# Patient Record
Sex: FEMALE | Race: WHITE | URBAN - METROPOLITAN AREA
[De-identification: names, ages, dates, MRNs, and addresses within clinical notes are randomized per-mention and may not be internally consistent; named-entity substitution may affect disease eponyms.]

---

## 2018-06-04 ENCOUNTER — OFFICE VISIT (OUTPATIENT)
Dept: OBGYN CLINIC | Age: 33
End: 2018-06-04

## 2018-06-04 VITALS
SYSTOLIC BLOOD PRESSURE: 106 MMHG | HEIGHT: 65 IN | DIASTOLIC BLOOD PRESSURE: 68 MMHG | WEIGHT: 122.6 LBS | BODY MASS INDEX: 20.43 KG/M2

## 2018-06-04 DIAGNOSIS — O20.0 THREATENED MISCARRIAGE IN EARLY PREGNANCY: Primary | ICD-10-CM

## 2018-06-04 PROBLEM — Z34.90 PREGNANT: Status: ACTIVE | Noted: 2018-06-04

## 2018-06-04 NOTE — PROGRESS NOTES
Pregnancy Confirmation Visit    Jenny Starkey is a 29 y.o. G1 at 9w5d by last menstrual period presenting for pregnancy confirmation visit. Ultrasound today concerning for missed ab (no FHTs, enlarged yolk sac, measuring several weeks behind LMP dating). This is strongly desired pregnancy for patient and her  Torres Lau, but they had not been trying to conceive for long, as they believe they conceived on their wedding night. Appropriately tearful today in light of ultrasound findings. Pt does not know her blood type. She had mild cramping a few weeks ago, no current cramping or bleeding. +mild fatigue, breast tenderness, and occasional mild nausea. No other problems or concerns. TV ULTRASOUND PERFORMED  A SINGLE NONVIABLE 5W6D IUP IS SEEN WITH ABSENT CARDIAC RHYTHM. GESTATIONAL AGE BASED ON TODAYS ULTRASOUND. [place] [facility] [place]. RIGHT OVARY APPEARS WITHIN NORMAL LIMITS. LEFT OVARY APPEARS WITHIN NORMAL LIMITS. NO FREE FLUID IS SEEN IN THE CDS. Ob/Gyn Hx:  G1  LMP- Date: 3/28/18 estimated --> EDUARDO 1/2/19  Menstrual pattern prior to conception: longer cycles lasting 35-45 days  Contraception at time of conception? none  Date of 1st positive UPT: 5/3/18  STI- denies  ? SA- yes,  \"Armando\"    Health maintenance:  Pap- 2016 or 2017, normal per patient in [facility] h/o abnl pap  Gardasil- outside of recommended age  [de-identified]- not indicated    History reviewed. No pertinent past medical history. History reviewed. No pertinent surgical history. Family History   Problem Relation Age of Onset    No Known Problems Mother     Hypertension Father     Lung Cancer Maternal Aunt     Diabetes Paternal Grandmother      Social History     Social History    Marital status:      Spouse name: N/A    Number of children: N/A    Years of education: N/A     Occupational History    Not on file.      Social History Main Topics    Smoking status: Never Smoker    Smokeless tobacco: Never Used    Alcohol use No    Drug use: No    Sexual activity: Yes     Partners: Male     Birth control/ protection: None     Other Topics Concern    Not on file     Social History Narrative    No narrative on file         Allergies   Allergen Reactions    Tellez Bowles Diarrhea         Review of Systems - History obtained from the patient  Constitutional: negative for weight loss, fever, night sweats, +fatigue  HEENT: negative for hearing loss, earache, congestion, snoring, sorethroat  CV: negative for chest pain, palpitations, edema  Resp: negative for cough, shortness of breath, wheezing  GI: negative for change in bowel habits, abdominal pain, black or bloody stools  : negative for frequency, dysuria, hematuria, vaginal discharge, no bleeding or cramping  MSK: negative for back pain, joint pain, muscle pain  Breast: negative for breast lumps, nipple discharge, galactorrhea, +tenderness  Skin :negative for itching, rash, hives  Neuro: negative for dizziness, headache, confusion, weakness  Psych: negative for anxiety, depression, change in mood  Heme/lymph: negative for bleeding, bruising, pallor    Physical Exam    Visit Vitals    /68 (BP 1 Location: Left arm, BP Patient Position: Sitting)    Ht 5' 5\" (1.651 m)    Wt 122 lb 9.6 oz (55.6 kg)    LMP 03/28/2018 (Exact Date)    BMI 20.4 kg/m2       Constitutional  · Appearance: well-nourished, well developed, alert, in no acute distress    HENT  · Head and Face: appears normal    Chest  · Respiratory Effort: non-labored breathing  · Auscultation: CTAB, normal breath sounds    Cardiovascular  · Heart:  · Auscultation: regular rate and rhythm without murmur  · Extremities: no peripheral edema    Gastrointestinal  · Abdominal Examination: abdomen non-tender to palpation, normal bowel sounds, no masses present  · Liver and spleen: no hepatomegaly present, spleen not palpable  · Hernias: no hernias identified    Genitourinary: deferred today    Skin  · General Inspection: no rash, no lesions identified    Neurologic/Psychiatric  · Mental Status:  · Orientation: grossly oriented to person, place and time  · Mood and Affect: mood normal, affect appropriate      Assessment/Plan:  29 y.o. G1 with concern for threatened vs. Missed ab on ultrasound today.     -reviewed US findings with patient today, and concern for possible miscarriage given discrepancy with LMP dating, enlarged yolk sac, and absence of FCM  -beta HCG today and again in 48 hours  -T&S today  -bleeding precautions reviewed  -RTC: 1 week for repeat ultrasound and follow up     Caro Youssef MD  6/4/2018  2:59 PM

## 2018-06-04 NOTE — PATIENT INSTRUCTIONS
Miscarriage: Care Instructions  Your Care Instructions    The loss of a pregnancy can be very hard. You may wonder why it happened or blame yourself. Miscarriages are common and are not caused by exercise, stress, or sex. Most happen because the fertilized egg in the uterus does not develop normally. There is no treatment that can stop a miscarriage. As long as you do not have heavy blood loss, fever, weakness, or other signs of infection, you can let a miscarriage follow its own course. This can take several days. Your body will recover over the next several weeks. Having a miscarriage does not mean you cannot have a normal pregnancy in the future. The doctor has checked you carefully, but problems can develop later. If you notice any problems or new symptoms, get medical treatment right away. Follow-up care is a key part of your treatment and safety. Be sure to make and go to all appointments, and call your doctor if you are having problems. It's also a good idea to know your test results and keep a list of the medicines you take. How can you care for yourself at home? · You will probably have some vaginal bleeding for 1 to 2 weeks. It may be similar to or slightly heavier than a normal period. The bleeding should get lighter after a week. Use pads instead of tampons. You may use tampons during your next period, which should start in 3 to 6 weeks. · Take an over-the-counter pain medicine, such as acetaminophen (Tylenol), ibuprofen (Advil, Motrin), or naproxen (Aleve) for cramps. Read and follow all instructions on the label. You may have cramps for several days after the miscarriage. · Do not take two or more pain medicines at the same time unless the doctor told you to. Many pain medicines have acetaminophen, which is Tylenol. Too much acetaminophen (Tylenol) can be harmful. · Use a clear container to save any tissue that you pass. Take it to your doctor's office as soon as you can.   · Do not have sex until the bleeding stops. · You may return to your normal activities if you feel well enough to do so. But you should avoid heavy exercise until the bleeding stops. · If you plan to get pregnant again, check with your doctor. Most doctors suggest waiting until you have had at least one normal period before you try to get pregnant. · If you do not want to get pregnant, ask your doctor about birth control. You can get pregnant again before your next period starts if you are not using birth control. · You may be low in iron because of blood loss. Eat a balanced diet that is high in iron and vitamin C. Foods rich in iron include red meat, shellfish, eggs, beans, and leafy green vegetables. Foods high in vitamin C include citrus fruits, tomatoes, and broccoli. Talk to your doctor about whether you need to take iron pills or a multivitamin. · The loss of a pregnancy can be very hard. You may wonder why it happened and blame yourself. Talking to family members, friends, a counselor, or your doctor may help you cope with your loss. When should you call for help? Call 911 anytime you think you may need emergency care. For example, call if:  ? · You passed out (lost consciousness). ?Call your doctor now or seek immediate medical care if:  ? · You have severe vaginal bleeding. ? · You are dizzy or lightheaded, or you feel like you may faint. ? · You have new or worse pain in your belly or pelvis. ? · You have a fever. ? · You have vaginal discharge that smells bad. ? Watch closely for changes in your health, and be sure to contact your doctor if:  ? · You do not get better as expected. Where can you learn more? Go to http://ivania-michelle.info/. Enter E802 in the search box to learn more about \"Miscarriage: Care Instructions. \"  Current as of: March 16, 2017  Content Version: 11.4  © 6375-9671 Healthwise, [employer].  Care instructions adapted under license by [employer] (which disclaims liability or warranty for this information). If you have questions about a medical condition or this instruction, always ask your healthcare professional. [address] any warranty or liability for your use of this information.

## 2018-06-04 NOTE — MR AVS SNAPSHOT
[address] [address] [address] [place] [address] 
[phone] Patient: Luz Carr MRN: [id number] :1985 Visit Information Date & Time Provider Department Dept. Phone Encounter #  
 2018  2:40 PM Mauricio Sanchez MD [address] [facility] [phone] [id number] Your Appointments 2018  2:40 PM  
New Patient with Mauricio Sanchez MD  
[address] [facility] ([facility]) Appt Note: NOB/US + Dr. Celina Márquez 4.4/POS UPT/bcbs [address], [address] [place] [address] [address] [place] [address] Upcoming Health Maintenance Date Due  
 PAP AKA CERVICAL CYTOLOGY 2008 Influenza Age 5 to Adult 2018 Allergies as of 2018  Never Reviewed Severity Noted Reaction Type Reactions Tellez Bowles  2018    Diarrhea Current Immunizations  Never Reviewed No immunizations on file. Not reviewed this visit Vitals BP Height(growth percentile) Weight(growth percentile) LMP  
 106/68 (BP 1 Location: Left arm, BP Patient Position: Sitting) 5' 5\" (1.651 m) 122 lb 9.6 oz (55.6 kg) 2018 (Exact Date) BMI OB Status Smoking Status 20.4 kg/m2 Having regular periods Never Smoker BMI and BSA Data Body Mass Index Body Surface Area  
 20.4 kg/m 2 1.6 m 2 Your Updated Medication List  
  
Notice  As of 2018  2:39 PM  
 You have not been prescribed any medications. Patient Instructions Miscarriage: Care Instructions Your Care Instructions The loss of a pregnancy can be very hard. You may wonder why it happened or blame yourself. Miscarriages are common and are not caused by exercise, stress, or sex. Most happen because the fertilized egg in the uterus does not develop normally. There is no treatment that can stop a miscarriage.  As long as you do not have heavy blood loss, fever, weakness, or other signs of infection, you can let a miscarriage follow its own course. This can take several days. Your body will recover over the next several weeks. Having a miscarriage does not mean you cannot have a normal pregnancy in the future. The doctor has checked you carefully, but problems can develop later. If you notice any problems or new symptoms, get medical treatment right away. Follow-up care is a key part of your treatment and safety. Be sure to make and go to all appointments, and call your doctor if you are having problems. It's also a good idea to know your test results and keep a list of the medicines you take. How can you care for yourself at home? · You will probably have some vaginal bleeding for 1 to 2 weeks. It may be similar to or slightly heavier than a normal period. The bleeding should get lighter after a week. Use pads instead of tampons. You may use tampons during your next period, which should start in 3 to 6 weeks. · Take an over-the-counter pain medicine, such as acetaminophen (Tylenol), ibuprofen (Advil, Motrin), or naproxen (Aleve) for cramps. Read and follow all instructions on the label. You may have cramps for several days after the miscarriage. · Do not take two or more pain medicines at the same time unless the doctor told you to. Many pain medicines have acetaminophen, which is Tylenol. Too much acetaminophen (Tylenol) can be harmful. · Use a clear container to save any tissue that you pass. Take it to your doctor's office as soon as you can. · Do not have sex until the bleeding stops. · You may return to your normal activities if you feel well enough to do so. But you should avoid heavy exercise until the bleeding stops. · If you plan to get pregnant again, check with your doctor. Most doctors suggest waiting until you have had at least one normal period before you try to get pregnant. · If you do not want to get pregnant, ask your doctor about birth control. You can get pregnant again before your next period starts if you are not using birth control. · You may be low in iron because of blood loss. Eat a balanced diet that is high in iron and vitamin C. Foods rich in iron include red meat, shellfish, eggs, beans, and leafy green vegetables. Foods high in vitamin C include citrus fruits, tomatoes, and broccoli. Talk to your doctor about whether you need to take iron pills or a multivitamin. · The loss of a pregnancy can be very hard. You may wonder why it happened and blame yourself. Talking to family members, friends, a counselor, or your doctor may help you cope with your loss. When should you call for help? Call 911 anytime you think you may need emergency care. For example, call if: 
? · You passed out (lost consciousness). ?Call your doctor now or seek immediate medical care if: 
? · You have severe vaginal bleeding. ? · You are dizzy or lightheaded, or you feel like you may faint. ? · You have new or worse pain in your belly or pelvis. ? · You have a fever. ? · You have vaginal discharge that smells bad. ? Watch closely for changes in your health, and be sure to contact your doctor if: 
? · You do not get better as expected. Where can you learn more? Go to http://ivania-michelle.info/. Enter E802 in the search box to learn more about \"Miscarriage: Care Instructions. \" Current as of: March 16, 2017 Content Version: 11.4 © 3181-6594 [employer]. Care instructions adapted under license by [employer] (which disclaims liability or warranty for this information). If you have questions about a medical condition or this instruction, always ask your healthcare professional. [address] [place] [address] any warranty or liability for your use of this information. Introducing [facility]! [facility] introduces [employer] patient portal. Now you can access parts of your medical record, email your doctor's office, and request medication refills online. 1. In your internet browser, go to [email or web address]/"[email or web address] 2. Click on the First Time User? Click Here link in the Sign In box. You will see the New Member Sign Up page. 3. Enter your [employer] Access Code exactly as it appears below. You will not need to use this code after youve completed the sign-up process. If you do not sign up before the expiration date, you must request a new code. · [employer] Access Code: [email or web address] Expires: 9/2/2018  2:39 PM 
 
4. Enter the last four digits of your Social Security Number (xxxx) and Date of Birth (mm/dd/yyyy) as indicated and click Submit. You will be taken to the next sign-up page. 5. Create a [employer] ID. This will be your [employer] login ID and cannot be changed, so think of one that is secure and easy to remember. 6. Create a [employer] password. You can change your password at any time. 7. Enter your Password Reset Question and Answer. This can be used at a later time if you forget your password. 8. Enter your e-mail address. You will receive e-mail notification when new information is available in [address] 9. Click Sign Up. You can now view and download portions of your medical record. 10. Click the Download Summary menu link to download a portable copy of your medical information. If you have questions, please visit the Frequently Asked Questions section of the [employer] website. Remember, [employer] is NOT to be used for urgent needs. For medical emergencies, dial 911. Now available from your iPhone and Android! Please provide this summary of care documentation to your next provider. If you have any questions after today's visit, please call [phone].

## 2018-06-05 LAB
ABO GROUP BLD: NORMAL
HCG INTACT+B SERPL-ACNC: NORMAL MIU/ML
RH BLD: NEGATIVE

## 2018-06-05 NOTE — PROGRESS NOTES
Blood type B negative. Please inform patient that she will need rhogam if she miscarries or has any significant bleeding events in the pregnancy. We await her repeat beta HCG level tomorrow.

## 2018-06-06 ENCOUNTER — LAB ONLY (OUTPATIENT)
Dept: OBGYN CLINIC | Age: 33
End: 2018-06-06

## 2018-06-06 ENCOUNTER — TELEPHONE (OUTPATIENT)
Dept: OBGYN CLINIC | Age: 33
End: 2018-06-06

## 2018-06-06 DIAGNOSIS — O20.0 THREATENED ABORTION: Primary | ICD-10-CM

## 2018-06-06 LAB — HCG INTACT+B SERPL-ACNC: NORMAL MIU/ML

## 2018-06-06 NOTE — PROGRESS NOTES
Spoke with patient and reviewed results, concern for likely missed ab at this time. Pt to follow up in office on Monday for further management if she hasn't passed pregnancy yet by that time.  Reviewed bleeding and infx precautions and reasons to call or go to hospital.

## 2018-06-06 NOTE — TELEPHONE ENCOUNTER
STAT LABS HAVE BEEN FAXED FOR YOUR REVIEW TO YOUR LOCATION (by Kristie Snyder LPN). Please see fax machine if not scanned in for you by staff.

## 2018-06-11 ENCOUNTER — ROUTINE PRENATAL (OUTPATIENT)
Dept: OBGYN CLINIC | Age: 33
End: 2018-06-11

## 2018-06-11 DIAGNOSIS — O20.0 THREATENED ABORTION: Primary | ICD-10-CM

## 2018-06-11 DIAGNOSIS — O02.1 MISSED AB: ICD-10-CM

## 2018-06-11 NOTE — PATIENT INSTRUCTIONS
Incomplete Miscarriage: Care Instructions  Your Care Instructions    A miscarriage is the loss of a pregnancy during the first 20 weeks. Miscarriages are very common. Most happen because the fertilized egg in the uterus does not develop normally. Stress, exercise, or sex does not cause a miscarriage. While many miscarriages pass on their own, some do not. These are called incomplete miscarriages because all of the tissue related to pregnancy is not shed from the uterus. An incomplete miscarriage often requires treatment. Medicine or a procedure call dilation and curettage (D&C) is used to clear the tissue from the uterus. If your blood type is Rh negative, ask your doctor if you need a shot of Rh immune globulin (RhoGAM) to prevent problems in future pregnancies. Follow-up care is a key part of your treatment and safety. Be sure to make and go to all appointments, and call your doctor if you are having problems. It's also a good idea to know your test results and keep a list of the medicines you take. How can you care for yourself at home? · You will probably have vaginal bleeding for 1 to 2 weeks after treatment. It may be similar to or slightly heavier than a normal period. Use pads instead of tampons. You may use tampons during your next period. It should start in 3 to 6 weeks. · Take an over-the-counter pain medicine, such as acetaminophen (Tylenol), ibuprofen (Advil, Motrin), or naproxen (Aleve), for cramps. You may have cramps for several days after the miscarriage. Be safe with medicines. Read and follow all instructions on the label. · Do not take two or more pain medicines at the same time unless the doctor told you to. Many pain medicines have acetaminophen, which is Tylenol. Too much acetaminophen (Tylenol) can be harmful. · Your doctor may ask you to use a clear container to save any tissue or clots that you pass. Take it to your doctor's office right away.   · Do not have sex until the bleeding stops. · You may return to your normal activities if you feel well enough to do so. But you should avoid heavy exercise until the bleeding stops. · If you plan to get pregnant again, check with your doctor. Most doctors suggest waiting until you have had at least one normal period before you try to get pregnant. · If you do not want to get pregnant, ask your doctor about birth control. You can get pregnant again before your next period starts. · You may be low in iron because of blood loss. Eat a balanced diet that is high in iron and vitamin C. Foods rich in iron include red meat, shellfish, eggs, beans, and leafy green vegetables. Talk to your doctor about whether you need to take iron pills or a multivitamin. · The loss of a pregnancy can be very hard. Give yourself and your partner time to grieve. Even if your miscarriage occurred very early, you may still have feelings of loss. You may wonder why it happened and blame yourself. ¨ Talking to family members, friends, or a counselor may help you cope with your loss. ¨ If your feelings of sadness last longer than 2 weeks, tell your doctor or a counselor. When should you call for help? Call 911 anytime you think you may need emergency care. For example, call if:  ? · You passed out (lost consciousness). ?Call your doctor now or seek immediate medical care if:  ? · You have severe vaginal bleeding. ? · You are dizzy or lightheaded, or you feel like you may faint. ? · You have a fever. ? · You have new or worse pain in your belly or pelvis. ? · You have vaginal discharge that smells bad. ? Watch closely for changes in your health, and be sure to contact your doctor if:  ? · You do not get better as expected. Where can you learn more? Go to http://ivania-michelle.info/. Enter L103 in the search box to learn more about \"Incomplete Miscarriage: Care Instructions. \"  Current as of: March 16, 2017  Content Version: 11.4  © 3149-3246 Healthwise, Inc[employer]. Care instructions adapted under license by [employer] (which disclaims liability or warranty for this information). If you have questions about a medical condition or this instruction, always ask your healthcare professional. [address] [address] any warranty or liability for your use of this information.

## 2018-06-11 NOTE — PROGRESS NOTES
OB Problem Follow Up Visit    Ashlee Perez is a 29 y.o. G1 who presents with her  Jacquelin Granado for follow up of suspected missed ab at approx 5 wks GA. Ultrasound today with absent cardiac rhythm. Pt starting to feel some mild cramping discomfort in lower back. Denies vaginal bleeding, fevers/chills. Rh neg. Beta HC/4 - 88856   [id number]    Ultrasound 18:  TV ULTRASOUND PERFORMED  A SINGLE NONVIABLE 5W6D IUP IS SEEN WITH ABSENT CARDIAC RHYTHM. GESTATIONAL AGE BASED ON TODAYS ULTRASOUND. ABNORMAL [address] IS SEEN. RIGHT OVARY APPEARS WITHIN NORMAL LIMITS. LEFT OVARY APPEARS WITHIN NORMAL LIMITS. NO FREE FLUID IS SEEN IN THE CDS. Ultrasound 18:  TV ULTRASOUND PERFORMED  A SINGLE NONVIABLE 5W6D IUP IS SEEN WITH ABSENT CARDIAC RHYTHM. GESTATIONAL AGE BASED ON TODAYS ULTRASOUND. ABNORMAL [address]OL[address] IS SEEN. RIGHT OVARY APPEARS WITHIN NORMAL LIMITS. LEFT OVARY APPEARS WITHIN NORMAL LIMITS. NO FREE FLUID IS SEEN IN THE CDS. Ob/Gyn Hx:  G1  LMP- Date: 3/28/18 estimated --> EDUARDO 19  Menstrual pattern prior to conception: longer cycles lasting 35-45 days  Contraception at time of conception? none  Date of 1st positive UPT: 5/3/18  STI- denies  ? SA- yes,  \"Armando\"    Health maintenance:  Pap- 2016 or 2017, normal per patient in [place], denies h/o abnl pap  Gardasil- outside of recommended age  [de-identified]- not indicated    No past medical history on file. No past surgical history on file. Family History   Problem Relation Age of Onset    No Known Problems Mother     Hypertension Father     Lung Cancer Maternal Aunt     Diabetes Paternal Grandmother      Social History     Social History    Marital status:      Spouse name: N/A    Number of children: N/A    Years of education: N/A     Occupational History    Not on file.      Social History Main Topics    Smoking status: Never Smoker    Smokeless tobacco: Never Used    Alcohol use No    Drug use: No    Sexual activity: Yes     Partners: Male     Birth control/ protection: None     Other Topics Concern    Not on file     Social History Narrative    Just  April 2018.  Adelaida Avila. 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 Allergies   Allergen Reactions    Tellez Bowles Diarrhea         Review of Systems - History obtained from the patient  Constitutional: negative for weight loss, fever, night sweats, +fatigue  HEENT: negative for hearing loss, earache, congestion, snoring, sorethroat  CV: negative for chest pain, palpitations, edema  Resp: negative for cough, shortness of breath, wheezing  GI: negative for change in bowel habits, abdominal pain, black or bloody stools  : negative for frequency, dysuria, hematuria, vaginal discharge, no bleeding or cramping  MSK: negative for back pain, joint pain, muscle pain  Breast: negative for breast lumps, nipple discharge, galactorrhea, +tenderness  Skin :negative for itching, rash, hives  Neuro: negative for dizziness, headache, confusion, weakness  Psych: negative for anxiety, depression, change in mood  Heme/lymph: negative for bleeding, bruising, pallor    Physical Exam    There were no vitals taken for this visit.     Constitutional  · Appearance: well-nourished, well developed, alert, in no acute distress    HENT  · Head and Face: appears normal    Chest  · Respiratory Effort: non-labored breathing  · Auscultation: CTAB, normal breath sounds    Cardiovascular  · Heart:  · Auscultation: regular rate and rhythm without murmur  · Extremities: no peripheral edema    Gastrointestinal  · Abdominal Examination: abdomen non-tender to palpation, normal bowel sounds, no masses present  · Liver and spleen: no hepatomegaly present, spleen not palpable  · Hernias: no hernias identified    Genitourinary: deferred today    Skin  · General Inspection: no rash, no lesions identified    Neurologic/Psychiatric  · Mental Status:  · Orientation: grossly oriented to person, place and time  · Mood and Affect: mood normal, affect appropriate      Assessment/Plan:  29 y.o. G1 with 5 week missed ab.     -reviewed US findings and HCG values with patient today  -condolences offered, reassured pt that risk of recurrence is not significantly increased after just one sab and that there was nothing she could have done to prevent this  -discussed options for management of missed ab (expectant, medical, surgical)  -pt would like to wait and see if she passes pregnancy on her own for 1 more week, if she has not passed pregnancy in 1 week she would like suction D&C  -consent forms for suction D&C signed today --> will fax to ANNA RIBERA for scheduling early next week  -will need Rhogam at time of sab or D&C d/t Rh neg status  -bleeding and infection precautions reviewed, and reasons to seek medical tx  -did not recommend genetics as this is her first miscarriage, but if collects tissue can send to pathologist  -discussed that she may resume attempts to conceive after her first normal cycle following resolution of current pregnancy    -RTC: 1 week    Fab Monteiro MD  6/11/2018  2:42 PM

## 2018-06-12 ENCOUNTER — TELEPHONE (OUTPATIENT)
Dept: OBGYN CLINIC | Age: 33
End: 2018-06-12

## 2018-06-12 DIAGNOSIS — O02.1 MISSED ABORTION: Primary | ICD-10-CM

## 2018-06-12 LAB — HCG INTACT+B SERPL-ACNC: NORMAL MIU/ML

## 2018-06-12 NOTE — TELEPHONE ENCOUNTER
MD Harmeet Manzanares; James Mac 10 minutes ago (1:04 PM)               I called and spoke with patient, informed her of results and answered questions. She is scheduled for [facility]  Monday (Routing comment)                    Elvin Martins MD   You 14 minutes ago (12:59 PM)                 Please let her know that her beta HCG value dropped from 60226 to 18550, consisted with a missed  (miscarriage). This diagnosis was discussed in office yesterday and will not be a surprise to her, the beta level just confirms our suspicion. She will be posted for a D&C early next week unless she changes her mind and wants to do medical management. Please let her know that I am happy to speak with her if she has other questions or concerns.  Thanks! (Routing comment)

## 2018-06-12 NOTE — TELEPHONE ENCOUNTER
Patient calling for her blood work beta results from yesterday. Patient advised that they are back but has not been reviewed. Patient asked me to send the message to Dr. Yessenia Selby for review and to get back with her on the results and recommendations.     Patient can be reached at [phone]

## 2018-06-14 ENCOUNTER — DOCUMENTATION ONLY (OUTPATIENT)
Dept: OBGYN CLINIC | Age: 33
End: 2018-06-14

## 2018-06-14 RX ORDER — MISOPROSTOL 200 UG/1
800 TABLET ORAL ONCE
Qty: 4 TAB | Refills: 1 | Status: SHIPPED | OUTPATIENT
Start: 2018-06-14 | End: 2018-06-14

## 2018-06-14 NOTE — PROGRESS NOTES
Returned patient phone call. She has decided after review of management options that she would prefer trial of medical management for 5 wk missed ab. Rx for cytotec 800mcg PV provided, with 1 refill (pt to repeat dose 24-48 hrs after 1st dose if she has not yet passed pregnancy). Reviewed ~80% success rate, potential side effects, bleeding precautions, infection/sepsis precautions, and reasons to go to hospital.    Pt does not plan to take medication until Monday because she will be out of town until that time. Pt will come to office for Rhogam injection when she passes pregnancy as she is Rh negative. Given change of plans, will cancel D&C for Monday and reschedule for later in the week in event that medical management is unsuccessful. Willy Lockwood (surgical scheduler) made aware of change in plans.     Steve Hansen MD  6/14/2018  3:31 PM

## 2018-06-15 ENCOUNTER — TELEPHONE (OUTPATIENT)
Dept: OBGYN CLINIC | Age: 33
End: 2018-06-15

## 2018-06-15 NOTE — TELEPHONE ENCOUNTER
Call received at 11:25am      29year old patient last seen in the office on 6/11/18. Patient calling to say that she started bleeding on her own and it is a light spotting . Patient states she started bleeding late last night. Patient denies cramping at this time. Patient has not taken the Cytotec. Patient is wondering if the bleeding will get heavy. Additionally patient is wondering when she should get the Rhogam since she is B negative . Patient states she would need to go the hospital in the [facility] this weekend or she could come to the office on Monday.  6/18/18        Please advise

## 2018-06-15 NOTE — TELEPHONE ENCOUNTER
Paris Mojica MD   You 6 minutes ago (1:05 PM)                 Bleeding will likely become significantly heavier if it is only just spotting now (Routing comment)                        Paris Mojica MD   You 8 minutes ago (1:03 PM)                   Generally speaking she should get the Rhogam injection within 72 hours of bleeding episode associated with pregnancy. If bleeding just started, she may be able to come in first thing Monday Morning for injection. Please reassure her that there is minimal evidence of significant feto-maternal hemorrhage in 1st trimester losses. Thanks!      Paris Mojica MD  6/15/2018  1:04 PM  (Routing comment)

## 2018-06-15 NOTE — TELEPHONE ENCOUNTER
Patient advised of MD recommendations and was placed on the schedule for Rhogam injection on 6./18/18 at 9:30am.      Patient verbalized understanding.

## 2018-06-18 ENCOUNTER — CLINICAL SUPPORT (OUTPATIENT)
Dept: OBGYN CLINIC | Age: 33
End: 2018-06-18

## 2018-06-18 DIAGNOSIS — Z67.91 RH NEGATIVE STATUS DURING PREGNANCY IN FIRST TRIMESTER: Primary | ICD-10-CM

## 2018-06-18 DIAGNOSIS — O26.891 RH NEGATIVE STATUS DURING PREGNANCY IN FIRST TRIMESTER: Primary | ICD-10-CM

## 2018-06-18 NOTE — PATIENT INSTRUCTIONS
Rho(D) Immune Globulin (By injection)   Rho(D) Immune Globulin (sang-lobito i-MUNE GLOB-ue-migue)  Given to a pregnant woman whose blood type is Rh-negative to keep the baby's blood from interacting with the mother's. Also treats a blood cell disorder called idiopathic thrombocytopenic purpura (ITP). Brand Name(s): HyperRHO S/D, MICRhoGAM Ultra-Filtered Plus, RhoGAM Ultra-Filtered Plus, Rhophylac, WinRho SDF   There may be other brand names for this medicine. When This Medicine Should Not Be Used: You should not receive Rho(D) immune globulin if you have had an allergic reaction to human immune globulin, or if you have certain bleeding problems (such as autoimmune hemolytic anemia) or immunoglobulin A (IgA) deficiency. This medicine should not be given to infants. How to Use This Medicine:   Injectable  · Your doctor will prescribe your exact dose and tell you how often it should be given. This medicine is given as a shot into a muscle or through a needle placed in one of your veins. · A nurse or other health provider will give you this medicine. .  If a dose is missed:   · It is very important that you receive this medicine on a fixed schedule if you are using the medicine for ITP or during pregnancy. If you are unable to keep an appointment for your injection, call your doctor or caregiver for instructions. Drugs and Foods to Avoid:   Ask your doctor or pharmacist before using any other medicine, including over-the-counter medicines, vitamins, and herbal products. · This medicine may interfere with vaccines. Ask your doctor before you get a flu shot or any other vaccines. Warnings While Using This Medicine:   · Make sure your doctor knows if you are pregnant or breastfeeding, or if you or your child have a history of kidney problems, anemia, blood clotting problems, heart or blood vessel problems (such as atherosclerosis), lung or breathing problems, or have had a stroke.   · Check with your doctor right away if you or your child have back pain, shaking chills, a fever, dark urine, a decreased amount of urine, a sudden weight gain, swelling of the hands or feet, or shortness of breath after receiving this vaccine. These may be symptoms of a serious blood problem called intravascular hemolysis (IVH). · This medicine is made from donated human blood. Some human blood products have transmitted certain viruses to people who have received them. The risk of getting a virus from medicines made from human blood has been greatly reduced in recent years. This is the result of required testing of human donors for certain viruses, and testing during the making of these medicines. Although the risk is low, talk with your doctor if you have concerns. · This medicine may cause serious types of allergic reactions, including anaphylaxis. Anaphylaxis can be life-threatening and requires immediate medical attention. Tell your doctor right away if you or your child have itching, a rash, hives, chest pain, dizziness or lightheadedness, trouble breathing, or any swelling of your hands, face, or mouth after you receive this medicine. · This medicine may cause blood clots, especially in patients with a history of blood clotting problems, heart disease, and atherosclerosis (hardening of the arteries) or circulation problems. Patients who stay in bed for a long time because of surgery or illness may also have blood clots. Check with your doctor right away if you or your child suddenly have chest pain, shortness of breath, a severe headache, leg pain, or problems with vision, speech, or walking. · This medicine may cause a rare and serious lung problem a few hours after it is given. Tell your doctor right away if you or your child have any breathing problems with or without a fever after you receive the medicine. · Your doctor will do lab tests at regular visits to check on the effects of this medicine. Keep all appointments.   Possible Side Effects While Using This Medicine:   Call your doctor right away if you notice any of these side effects:  · Allergic reaction: Itching or hives, swelling in your face or hands, swelling or tingling in your mouth or throat, chest tightness, trouble breathing  · Back pain, shaking chills, fever, or shortness of breath. · Black, bloody, or tarry stools. · Bloody or darkened urine. · Chest pain, shortness of breath, or coughing up blood. · Decrease in how much or how often you urinate. · Fast, slow, pounding, or uneven heartbeat. · Lightheadedness, dizziness, or fainting. · Numbness or weakness in your arm or leg, or on one side of your body. · Pain in your lower leg (calf). · Pinpoint red spots on the skin. · Rapid weight gain. · Sudden or severe headache, or problems with vision, speech, or walking. · Swelling in your hands, ankles, or feet. · Unusual bleeding or bruising. If you notice these less serious side effects, talk with your doctor:   · Diarrhea, nausea, or vomiting. · Headache. · Joint or muscle pain. · Pain, itching, burning, swelling, or a lump under your skin where the needle is placed. · Rash or itching skin. · Sleepiness or unusual drowsiness. · Tiredness. If you notice other side effects that you think are caused by this medicine, tell your doctor. Call your doctor for medical advice about side effects. You may report side effects to FDA at 2-360-NYM-3228  © 2017 [facility] Information is for End User's use only and may not be sold, redistributed or otherwise used for commercial purposes. The above information is an  only. It is not intended as medical advice for individual conditions or treatments. Talk to your doctor, nurse or pharmacist before following any medical regimen to see if it is safe and effective for you.

## 2018-06-27 ENCOUNTER — OFFICE VISIT (OUTPATIENT)
Dept: OBGYN CLINIC | Age: 33
End: 2018-06-27

## 2018-06-27 VITALS
DIASTOLIC BLOOD PRESSURE: 62 MMHG | WEIGHT: 121.2 LBS | HEIGHT: 65 IN | BODY MASS INDEX: 20.19 KG/M2 | SYSTOLIC BLOOD PRESSURE: 100 MMHG

## 2018-06-27 DIAGNOSIS — Z87.59 MISCARRIAGE WITHIN LAST 12 MONTHS: Primary | ICD-10-CM

## 2018-06-27 LAB
HCG URINE, QL. (POC): NEGATIVE
VALID INTERNAL CONTROL?: YES

## 2018-06-27 NOTE — PATIENT INSTRUCTIONS
Miscarriage: Care Instructions  Your Care Instructions    The loss of a pregnancy can be very hard. You may wonder why it happened or blame yourself. Miscarriages are common and are not caused by exercise, stress, or sex. Most happen because the fertilized egg in the uterus does not develop normally. There is no treatment that can stop a miscarriage. As long as you do not have heavy blood loss, fever, weakness, or other signs of infection, you can let a miscarriage follow its own course. This can take several days. Your body will recover over the next several weeks. Having a miscarriage does not mean you cannot have a normal pregnancy in the future. The doctor has checked you carefully, but problems can develop later. If you notice any problems or new symptoms, get medical treatment right away. Follow-up care is a key part of your treatment and safety. Be sure to make and go to all appointments, and call your doctor if you are having problems. It's also a good idea to know your test results and keep a list of the medicines you take. How can you care for yourself at home? · You will probably have some vaginal bleeding for 1 to 2 weeks. It may be similar to or slightly heavier than a normal period. The bleeding should get lighter after a week. Use pads instead of tampons. You may use tampons during your next period, which should start in 3 to 6 weeks. · Take an over-the-counter pain medicine, such as acetaminophen (Tylenol), ibuprofen (Advil, Motrin), or naproxen (Aleve) for cramps. Read and follow all instructions on the label. You may have cramps for several days after the miscarriage. · Do not take two or more pain medicines at the same time unless the doctor told you to. Many pain medicines have acetaminophen, which is Tylenol. Too much acetaminophen (Tylenol) can be harmful. · Use a clear container to save any tissue that you pass. Take it to your doctor's office as soon as you can.   · Do not have sex until the bleeding stops. · You may return to your normal activities if you feel well enough to do so. But you should avoid heavy exercise until the bleeding stops. · If you plan to get pregnant again, check with your doctor. Most doctors suggest waiting until you have had at least one normal period before you try to get pregnant. · If you do not want to get pregnant, ask your doctor about birth control. You can get pregnant again before your next period starts if you are not using birth control. · You may be low in iron because of blood loss. Eat a balanced diet that is high in iron and vitamin C. Foods rich in iron include red meat, shellfish, eggs, beans, and leafy green vegetables. Foods high in vitamin C include citrus fruits, tomatoes, and broccoli. Talk to your doctor about whether you need to take iron pills or a multivitamin. · The loss of a pregnancy can be very hard. You may wonder why it happened and blame yourself. Talking to family members, friends, a counselor, or your doctor may help you cope with your loss. When should you call for help? Call 911 anytime you think you may need emergency care. For example, call if:  ? · You passed out (lost consciousness). ?Call your doctor now or seek immediate medical care if:  ? · You have severe vaginal bleeding. ? · You are dizzy or lightheaded, or you feel like you may faint. ? · You have new or worse pain in your belly or pelvis. ? · You have a fever. ? · You have vaginal discharge that smells bad. ? Watch closely for changes in your health, and be sure to contact your doctor if:  ? · You do not get better as expected. Where can you learn more? Go to http://ivania-michelle.info/. Enter E802 in the search box to learn more about \"Miscarriage: Care Instructions. \"  Current as of: March 16, 2017  Content Version: 11.4  © 8784-0363 [employer], [employer].  Care instructions adapted under license by [employer] (which disclaims liability or warranty for this information). If you have questions about a medical condition or this instruction, always ask your healthcare professional. [address] any warranty or liability for your use of this information.

## 2018-06-27 NOTE — PROGRESS NOTES
Miscarriage Follow Up Visit    Dayana Felipe is a 29 y.o.  A1 who presents for follow up of recent miscarriage. Dark brown vaginal discharge continues, but is tapering off. No further cramping, no fevers/chills, otherwise feeling well. UPT negative in office today. Rh negative, Rhogam given in the office on 18. Ob/Gyn Hx:   A1 - SAB x1  Menses: longer cycles lasting 35-45 days  STI- denies  ? SA- yes,  \"Armando\"    Health maintenance:  Pap- 2016 or 2017, normal per patient in [place], denies h/o abnl pap  Gardasil- outside of recommended age    Past Medical History:   Diagnosis Date    GERD (gastroesophageal reflux disease)         History reviewed. No pertinent surgical history. Family History   Problem Relation Age of Onset    No Known Problems Mother     Hypertension Father     Lung Cancer Maternal Aunt     No Known Problems Sister     No Known Problems Brother     Diabetes Maternal Grandmother     Psychiatric Disorder Sister     Other Sister      HEART MURMER    No Known Problems Sister     Anesth Problems Neg Hx      Social History     Social History    Marital status:      Spouse name: N/A    Number of children: N/A    Years of education: N/A     Occupational History    Not on file. Social History Main Topics    Smoking status: Never Smoker    Smokeless tobacco: Never Used    Alcohol use 1.8 oz/week     3 Cans of beer per week      Comment: 3/WK    Drug use: No    Sexual activity: Yes     Partners: Male     Birth control/ protection: None     Other Topics Concern    Not on file     Social History Narrative    Just  2018.  Anders Cowden. 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          Allergies   Allergen Reactions    Tellez Bowles Diarrhea     VOMITTING AND DIARRHEA         Review of Systems - History obtained from the patient  Constitutional: negative for weight loss, fever, night sweats, +fatigue  HEENT: negative for hearing loss, earache, congestion, snoring, sorethroat  CV: negative for chest pain, palpitations, edema  Resp: negative for cough, shortness of breath, wheezing  GI: negative for change in bowel habits, abdominal pain, black or bloody stools  : negative for frequency, dysuria, hematuria, vaginal discharge, no bleeding or cramping  MSK: negative for back pain, joint pain, muscle pain  Breast: negative for breast lumps, nipple discharge, galactorrhea, +tenderness  Skin :negative for itching, rash, hives  Neuro: negative for dizziness, headache, confusion, weakness  Psych: negative for anxiety, depression, change in mood  Heme/lymph: negative for bleeding, bruising, pallor    Physical Exam  Visit Vitals    /62 (BP 1 Location: Left arm, BP Patient Position: Sitting)    Ht 5' 5\" (1.651 m)    Wt 121 lb 3.2 oz (55 kg)    BMI 20.17 kg/m2     Constitutional  · Appearance: well-nourished, well developed, alert, in no acute distress    HENT  · Head and Face: appears normal    Chest  · Respiratory Effort: non-labored breathing    Neurologic/Psychiatric  · Mental Status:  · Orientation: grossly oriented to person, place and time  · Mood and Affect: mood normal, affect appropriate    Recent Results (from the past 12 hour(s))   AMB POC URINE PREGNANCY TEST, VISUAL COLOR COMPARISON    Collection Time: 06/27/18 11:45 AM   Result Value Ref Range    VALID INTERNAL CONTROL POC Yes     HCG urine, Ql. (POC) Negative Negative     Assessment/Plan:  29 y.o. G1 s/p completed sab (at approx 5 wks GA).  UPT neg today, consistent with resolution of pregnancy    -reassurance provided that pregnancy seems to be resolved at this time  -discussed that she may resume attempts to conceive after her first normal cycle following resolution of current pregnancy  -given longer cycles, advised menstrual calender, discussed OPKs and optimal timing of IC  -advised continue PNV    -RTC: for annual exam or sooner andriy Hansen MD  6/27/2018  11:51 AM

## 2018-09-04 ENCOUNTER — OFFICE VISIT (OUTPATIENT)
Dept: OBGYN CLINIC | Age: 33
End: 2018-09-04

## 2018-09-04 VITALS
BODY MASS INDEX: 20.33 KG/M2 | RESPIRATION RATE: 16 BRPM | DIASTOLIC BLOOD PRESSURE: 72 MMHG | HEIGHT: 65 IN | WEIGHT: 122 LBS | SYSTOLIC BLOOD PRESSURE: 118 MMHG

## 2018-09-04 DIAGNOSIS — R10.2 PELVIC PAIN: Primary | ICD-10-CM

## 2018-09-04 NOTE — PROGRESS NOTES
Justin Carrillo is a 29 y.o. female    Chief Complaint   Patient presents with    Abdominal Cramping     1. Have you been to the ER, urgent care clinic since your last visit? Hospitalized since your last visit? No     2. Have you seen or consulted any other health care providers outside of the [address] [place] [address] since your last visit? Include any pap smears or colon screening.   No     Visit Vitals    /72    Resp 16    Ht 5' 5\" (1.651 m)    Wt 122 lb (55.3 kg)    BMI 20.3 kg/m2

## 2018-09-04 NOTE — PROGRESS NOTES
Problem visit    Nomi Shepard is a 29 y.o.  A1 who presents for evaluation of pelvic pain/cramping. Pt with spontaneous miscarriage in , she had her first normal menstrual cycle the first week of August, then 2 weeks later she noticed some pain/cramping for approx 7 days around mid-cycle. Also with lower back discomfort. She didn't know if this could be ovulatory pain or something else. Pain has since resolved, and pt is otherwise asymptomatic. Denies vaginal discharge, fevers/chills, N/V/D, urinary symptoms, or other concerns. Took home UPT this morning which was negative. She and her  are not yet actively trying to conceive but did have unprotected IC.    Ob/Gyn Hx:   A1 - SAB x1  Menses: longer cycles lasting 35-45 days  STI- denies  ? SA- yes,  \"Armando\"    Health maintenance:  Pap- 2016 or 2017, normal per patient in [place], denies h/o abnl pap  Gardasil- outside of recommended age    Past Medical History:   Diagnosis Date    GERD (gastroesophageal reflux disease)         History reviewed. No pertinent surgical history. Family History   Problem Relation Age of Onset    No Known Problems Mother     Hypertension Father     Lung Cancer Maternal Aunt     No Known Problems Sister     No Known Problems Brother     Diabetes Maternal Grandmother     Psychiatric Disorder Sister     Other Sister      HEART MURMER    No Known Problems Sister     Anesth Problems Neg Hx      Social History     Social History    Marital status:      Spouse name: N/A    Number of children: N/A    Years of education: N/A     Occupational History    Not on file.      Social History Main Topics    Smoking status: Never Smoker    Smokeless tobacco: Never Used    Alcohol use 1.8 oz/week     3 Cans of beer per week      Comment: 3/WK    Drug use: No    Sexual activity: Yes     Partners: Male     Birth control/ protection: None     Other Topics Concern    Not on file     Social History Narrative    Just  April 2018.  Moriah Boss. 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 Allergies   Allergen Reactions    Tellez Bowles Diarrhea     VOMITTING AND DIARRHEA         Review of Systems - History obtained from the patient  Constitutional: negative for weight loss, fever, night sweats  HEENT: negative for hearing loss, earache, congestion, snoring, sorethroat  CV: negative for chest pain, palpitations, edema  Resp: negative for cough, shortness of breath, wheezing  GI: negative for change in bowel habits, abdominal pain, black or bloody stools  : negative for frequency, dysuria, hematuria, vaginal discharge, +pelvic pain/cramping, resolved  MSK: negative for back pain, joint pain, muscle pain  Breast: negative for breast lumps, nipple discharge, galactorrhea  Skin :negative for itching, rash, hives  Neuro: negative for dizziness, headache, confusion, weakness  Psych: negative for anxiety, depression, change in mood  Heme/lymph: negative for bleeding, bruising, pallor    Physical Exam  Visit Vitals    /72    Resp 16    Ht 5' 5\" (1.651 m)    Wt 122 lb (55.3 kg)    BMI 20.3 kg/m2     Constitutional  · Appearance: well-nourished, well developed, alert, in no acute distress    HENT  · Head and Face: appears normal    Chest  · Respiratory Effort: non-labored breathing    Abdomen: soft, non-tender, non-distended, no rebound or guarding, no CVAT    : external genitalia normal appearing, speculum exam - normal vaginal vault, no cervicitis, scant amount of normal vaginal discharge (thin/clear), vaginal mucosa normal appearing, no lesions. Bimanual exam - normal, no uterine or adnexal tenderness or masses appreciated, uterus small and mobile, no CMT.      Neurologic/Psychiatric  · Mental Status:  · Orientation: grossly oriented to person, place and time  · Mood and Affect: mood normal, affect appropriate    No results found for this or any previous visit (from the past 12 hour(s)). Assessment/Plan:  29 y.o. Melo Fairly presenting for 1 week of pelvic cramping mid-cycle that has now resolved. Possibly mittleschmertz (ovulatory pain). Benign exam today.  Reassurance provided.    -Discussed that if pain persists with subsequent menstrual cycles could consider repeat TVUS  -for now, recommend menstrual calendar and pain diary  -pt may resume attempts to conceive at this time  -continue PNV and toxin avoidance    -RTC: for annual exam or sooner andriy Hart MD  9/4/2018  11:51 AM

## 2022-09-13 NOTE — PROGRESS NOTES
Long discussion with patient about medical vs. Surgical management options for 5 wk missed ab. Pt has changed her mind and would like to try cytotec before undergoing D&C. Since she is out of town, the earliest she would do the cytotec is Monday. Rx for cytotec 800mcg PV provided with 1 refill for 2nd dose if necessary. Again reviewed reasons to come to hospital including heavy bleeding or signs of infx/sepsis. Pt to come for Rhogam injection as she is Rh negative once she passes the pregnancy. If she has not passed pregnancy with cytotec, will plan for D&C later next week. Will have Maria M schedule. [Hyperlipidemia] : hyperlipidemia [FreeTextEntry1] : LIPIDS\par wgt loss \par ABD pain\par MR \par hyperlipidemia